# Patient Record
Sex: MALE | URBAN - METROPOLITAN AREA
[De-identification: names, ages, dates, MRNs, and addresses within clinical notes are randomized per-mention and may not be internally consistent; named-entity substitution may affect disease eponyms.]

---

## 2017-05-10 ENCOUNTER — NURSE TRIAGE (OUTPATIENT)
Dept: ADMINISTRATIVE | Facility: CLINIC | Age: 27
End: 2017-05-10

## 2017-05-11 NOTE — TELEPHONE ENCOUNTER
Reason for Disposition   Minor injury or pain from twisting or over-stretching (all triage questions negative)    Protocols used: ST TRAUMA - HAND AND WRIST-[facility] calling to report swelling around his left wrist where his hand meets his thumb. He states there is an area about grape sized that is swollen. He states it wasn't there earlier today but he does admit to canoeing when he got off work today (he is unsure that he injured it at that time). He denies redness, pain, or fever. He doesn't think he was bitten by anything. He is able to move his wrist normally. Advised to be seen by pcp within 1-2 days for evaluation. Discussed taking a picture tonight and tomorrow and going forward so he will be able to compare it. Advised to ice it to see if that reduces the area because if it is inflammation, ice will help with that. Discussed if any worsening symptoms like spreading redness, increasing pain, fever to call back. Offered to schedule an appt but he states he has a pcp where he lives he can call tomorrow.